# Patient Record
Sex: FEMALE | Race: WHITE | NOT HISPANIC OR LATINO | Employment: FULL TIME | URBAN - METROPOLITAN AREA
[De-identification: names, ages, dates, MRNs, and addresses within clinical notes are randomized per-mention and may not be internally consistent; named-entity substitution may affect disease eponyms.]

---

## 2024-03-25 ENCOUNTER — HOSPITAL ENCOUNTER (OUTPATIENT)
Dept: ULTRASOUND IMAGING | Facility: HOSPITAL | Age: 34
Discharge: HOME | End: 2024-03-25
Admitting: OBSTETRICS & GYNECOLOGY
Payer: COMMERCIAL

## 2024-03-25 DIAGNOSIS — R10.2 PELVIC PAIN IN FEMALE: ICD-10-CM

## 2024-03-25 DIAGNOSIS — N92.0 MENORRHAGIA WITH REGULAR CYCLE: ICD-10-CM

## 2024-03-25 PROCEDURE — 76830 TRANSVAGINAL US NON-OB: CPT

## 2024-03-26 ENCOUNTER — TELEPHONE (OUTPATIENT)
Dept: OBSTETRICS AND GYNECOLOGY | Facility: CLINIC | Age: 34
End: 2024-03-26
Payer: COMMERCIAL

## 2024-03-27 ENCOUNTER — OFFICE VISIT (OUTPATIENT)
Dept: OBSTETRICS AND GYNECOLOGY | Facility: CLINIC | Age: 34
End: 2024-03-27
Payer: COMMERCIAL

## 2024-03-27 VITALS
HEART RATE: 89 BPM | BODY MASS INDEX: 23.63 KG/M2 | WEIGHT: 147 LBS | SYSTOLIC BLOOD PRESSURE: 115 MMHG | DIASTOLIC BLOOD PRESSURE: 79 MMHG | HEIGHT: 66 IN

## 2024-03-27 DIAGNOSIS — R93.89 THICKENED ENDOMETRIUM: Primary | ICD-10-CM

## 2024-03-27 PROCEDURE — 99213 OFFICE O/P EST LOW 20 MIN: CPT | Performed by: OBSTETRICS & GYNECOLOGY

## 2024-03-27 NOTE — PROGRESS NOTES
"GYN Problem/Follow Up Visit    Chief Complaint   Patient presents with    Follow-up     FOLLOW UP U/S           HPI  Abi HENDERSON is a 33 y.o. female, , who presents for u/s f/u. Seen in 2023 for heavy menses and pelvic pain but states life got really hectic and she just now had time for her u/s. Got  and they started trying to conceive in January. States menses returned to her normal and are not as heavy anymore. Does still have occ pelvic pain. No new concerns.       Additional OB/GYN History   No LMP recorded.  Current contraception: contraceptive methods: None  Desires to: do not start contraception  Allergies : Patient has no known allergies.     The additional following portions of the patient's history were reviewed and updated as appropriate: allergies, current medications, past family history, past medical history, past social history, past surgical history, and problem list.    Review of Systems    I have reviewed and agree with the HPI, ROS, and historical information as entered above. Eneida Melendez, BARRERA    Objective   /79   Pulse 89   Ht 167.6 cm (66\")   Wt 66.7 kg (147 lb)   BMI 23.73 kg/m²     Physical Exam  Vitals reviewed.   Neurological:      Mental Status: She is alert and oriented to person, place, and time.            Assessment and Plan    Diagnoses and all orders for this visit:    1. Thickened endometrium (Primary)  -     US Non-ob Transvaginal; Future    Reviewed pelvic u/s from 3/25/24: small uterine fibroid. Endometrium 1.7 cm. Ovaries normal. Discussed repeating pelvic u/s in 6-8 weeks to monitor the endometrium and pt desires. F/u after u/s.     Counseling:  She understands the importance of having the above orders performed in a timely fashion.  She is encouraged to review her results online and/or contact or office if she has questions.     Follow Up:  Return for u/s f/u.      BARRERA Navarro  2024  "

## 2025-01-17 ENCOUNTER — HOSPITAL ENCOUNTER (EMERGENCY)
Facility: HOSPITAL | Age: 35
Discharge: HOME | End: 2025-01-17
Attending: EMERGENCY MEDICINE
Payer: COMMERCIAL

## 2025-01-17 VITALS
BODY MASS INDEX: 27.13 KG/M2 | DIASTOLIC BLOOD PRESSURE: 72 MMHG | SYSTOLIC BLOOD PRESSURE: 110 MMHG | RESPIRATION RATE: 18 BRPM | HEIGHT: 66 IN | HEART RATE: 83 BPM | TEMPERATURE: 98.1 F | OXYGEN SATURATION: 97 %

## 2025-01-17 DIAGNOSIS — O21.9 VOMITING AFFECTING PREGNANCY: Primary | ICD-10-CM

## 2025-01-17 LAB
ALBUMIN SERPL-MCNC: 4.1 G/DL (ref 3.5–5.2)
ALBUMIN/GLOB SERPL: 1.2 G/DL
ALP SERPL-CCNC: 74 U/L (ref 39–117)
ALT SERPL W P-5'-P-CCNC: 26 U/L (ref 1–33)
ANION GAP SERPL CALCULATED.3IONS-SCNC: 15 MMOL/L (ref 5–15)
AST SERPL-CCNC: 30 U/L (ref 1–32)
BACTERIA UR QL AUTO: ABNORMAL /HPF
BASOPHILS # BLD AUTO: 0.04 10*3/MM3 (ref 0–0.2)
BASOPHILS NFR BLD AUTO: 0.3 % (ref 0–1.5)
BILIRUB SERPL-MCNC: 0.3 MG/DL (ref 0–1.2)
BILIRUB UR QL STRIP: NEGATIVE
BUN SERPL-MCNC: 10 MG/DL (ref 6–20)
BUN/CREAT SERPL: 20.8 (ref 7–25)
CALCIUM SPEC-SCNC: 8.9 MG/DL (ref 8.6–10.5)
CHLORIDE SERPL-SCNC: 100 MMOL/L (ref 98–107)
CLARITY UR: CLEAR
CO2 SERPL-SCNC: 17 MMOL/L (ref 22–29)
COLOR UR: YELLOW
CREAT SERPL-MCNC: 0.48 MG/DL (ref 0.57–1)
DEPRECATED RDW RBC AUTO: 41.6 FL (ref 37–54)
EGFRCR SERPLBLD CKD-EPI 2021: 126.1 ML/MIN/1.73
EOSINOPHIL # BLD AUTO: 0.02 10*3/MM3 (ref 0–0.4)
EOSINOPHIL NFR BLD AUTO: 0.1 % (ref 0.3–6.2)
ERYTHROCYTE [DISTWIDTH] IN BLOOD BY AUTOMATED COUNT: 14.5 % (ref 12.3–15.4)
FLUAV SUBTYP SPEC NAA+PROBE: NOT DETECTED
FLUBV RNA ISLT QL NAA+PROBE: NOT DETECTED
GLOBULIN UR ELPH-MCNC: 3.4 GM/DL
GLUCOSE SERPL-MCNC: 141 MG/DL (ref 65–99)
GLUCOSE UR STRIP-MCNC: NEGATIVE MG/DL
HCG INTACT+B SERPL-ACNC: 5594 MIU/ML
HCT VFR BLD AUTO: 31.4 % (ref 34–46.6)
HGB BLD-MCNC: 9.7 G/DL (ref 12–15.9)
HGB UR QL STRIP.AUTO: NEGATIVE
HOLD SPECIMEN: NORMAL
HOLD SPECIMEN: NORMAL
HYALINE CASTS UR QL AUTO: ABNORMAL /LPF
IMM GRANULOCYTES # BLD AUTO: 0.07 10*3/MM3 (ref 0–0.05)
IMM GRANULOCYTES NFR BLD AUTO: 0.5 % (ref 0–0.5)
KETONES UR QL STRIP: ABNORMAL
LEUKOCYTE ESTERASE UR QL STRIP.AUTO: NEGATIVE
LIPASE SERPL-CCNC: 158 U/L (ref 13–60)
LYMPHOCYTES # BLD AUTO: 1.79 10*3/MM3 (ref 0.7–3.1)
LYMPHOCYTES NFR BLD AUTO: 12.9 % (ref 19.6–45.3)
MCH RBC QN AUTO: 24.9 PG (ref 26.6–33)
MCHC RBC AUTO-ENTMCNC: 30.9 G/DL (ref 31.5–35.7)
MCV RBC AUTO: 80.7 FL (ref 79–97)
MONOCYTES # BLD AUTO: 0.94 10*3/MM3 (ref 0.1–0.9)
MONOCYTES NFR BLD AUTO: 6.8 % (ref 5–12)
NEUTROPHILS NFR BLD AUTO: 11.04 10*3/MM3 (ref 1.7–7)
NEUTROPHILS NFR BLD AUTO: 79.4 % (ref 42.7–76)
NITRITE UR QL STRIP: NEGATIVE
NRBC BLD AUTO-RTO: 0 /100 WBC (ref 0–0.2)
PH UR STRIP.AUTO: 6 [PH] (ref 5–8)
PLATELET # BLD AUTO: 351 10*3/MM3 (ref 140–450)
PMV BLD AUTO: 9.7 FL (ref 6–12)
POTASSIUM SERPL-SCNC: 3.9 MMOL/L (ref 3.5–5.2)
PROT SERPL-MCNC: 7.5 G/DL (ref 6–8.5)
PROT UR QL STRIP: ABNORMAL
RBC # BLD AUTO: 3.89 10*6/MM3 (ref 3.77–5.28)
RBC # UR STRIP: ABNORMAL /HPF
REF LAB TEST METHOD: ABNORMAL
RSV RNA NPH QL NAA+NON-PROBE: NOT DETECTED
SARS-COV-2 RNA RESP QL NAA+PROBE: NOT DETECTED
SODIUM SERPL-SCNC: 132 MMOL/L (ref 136–145)
SP GR UR STRIP: 1.03 (ref 1–1.03)
SQUAMOUS #/AREA URNS HPF: ABNORMAL /HPF
UROBILINOGEN UR QL STRIP: ABNORMAL
WBC # UR STRIP: ABNORMAL /HPF
WBC NRBC COR # BLD AUTO: 13.9 10*3/MM3 (ref 3.4–10.8)
WHOLE BLOOD HOLD COAG: NORMAL
WHOLE BLOOD HOLD SPECIMEN: NORMAL

## 2025-01-17 PROCEDURE — 96375 TX/PRO/DX INJ NEW DRUG ADDON: CPT

## 2025-01-17 PROCEDURE — 25010000002 THIAMINE PER 100 MG

## 2025-01-17 PROCEDURE — 36415 COLL VENOUS BLD VENIPUNCTURE: CPT

## 2025-01-17 PROCEDURE — 81001 URINALYSIS AUTO W/SCOPE: CPT

## 2025-01-17 PROCEDURE — 83690 ASSAY OF LIPASE: CPT

## 2025-01-17 PROCEDURE — 85025 COMPLETE CBC W/AUTO DIFF WBC: CPT

## 2025-01-17 PROCEDURE — 96374 THER/PROPH/DIAG INJ IV PUSH: CPT

## 2025-01-17 PROCEDURE — 87637 SARSCOV2&INF A&B&RSV AMP PRB: CPT

## 2025-01-17 PROCEDURE — 25010000002 ONDANSETRON PER 1 MG

## 2025-01-17 PROCEDURE — 25810000003 SODIUM CHLORIDE 0.9 % SOLUTION

## 2025-01-17 PROCEDURE — 99283 EMERGENCY DEPT VISIT LOW MDM: CPT

## 2025-01-17 PROCEDURE — 80053 COMPREHEN METABOLIC PANEL: CPT

## 2025-01-17 PROCEDURE — 84702 CHORIONIC GONADOTROPIN TEST: CPT

## 2025-01-17 RX ORDER — ONDANSETRON 2 MG/ML
4 INJECTION INTRAMUSCULAR; INTRAVENOUS ONCE
Status: COMPLETED | OUTPATIENT
Start: 2025-01-17 | End: 2025-01-17

## 2025-01-17 RX ORDER — THIAMINE HYDROCHLORIDE 100 MG/ML
100 INJECTION, SOLUTION INTRAMUSCULAR; INTRAVENOUS ONCE
Status: COMPLETED | OUTPATIENT
Start: 2025-01-17 | End: 2025-01-17

## 2025-01-17 RX ORDER — SODIUM CHLORIDE 0.9 % (FLUSH) 0.9 %
10 SYRINGE (ML) INJECTION AS NEEDED
Status: DISCONTINUED | OUTPATIENT
Start: 2025-01-17 | End: 2025-01-17 | Stop reason: HOSPADM

## 2025-01-17 RX ADMIN — SODIUM CHLORIDE 1000 ML: 9 INJECTION, SOLUTION INTRAVENOUS at 16:48

## 2025-01-17 RX ADMIN — SODIUM CHLORIDE 1000 ML: 9 INJECTION, SOLUTION INTRAVENOUS at 15:30

## 2025-01-17 RX ADMIN — THIAMINE HYDROCHLORIDE 100 MG: 100 INJECTION, SOLUTION INTRAMUSCULAR; INTRAVENOUS at 15:40

## 2025-01-17 RX ADMIN — ONDANSETRON 4 MG: 2 INJECTION INTRAMUSCULAR; INTRAVENOUS at 15:30

## 2025-01-17 NOTE — ED PROVIDER NOTES
Time: 3:00 PM EST  Date of encounter:  1/17/2025  Independent Historian/Clinical History and Information was obtained by:   Patient    History is limited by: N/A    Chief Complaint: Vomiting      History of Present Illness:  Patient is a 34 y.o. year old female who presents to the emergency department for evaluation of vomiting ongoing for 1 day.  Patient reports she is currently 26 weeks pregnant with LMP 7/18/2024.  Patient reports similar episode of vomiting 2 weeks ago when she was diagnosed with flu but that her symptoms resolved and began again last night.  Patient reports she was sent here by urgent care due to vomiting.  G1, P0.  Patient reports no complications thus far with pregnancy.  Denies any abdominal pain, fevers, chills, body aches, vaginal bleeding, back pain.      Patient Care Team  Primary Care Provider: [place] APRN    Past Medical History:     No Known Allergies  Past Medical History:   Diagnosis Date    Anxiety     Depression      History reviewed. No pertinent surgical history.  Family History   Problem Relation Age of Onset    Lung cancer Mother     Stroke Paternal Grandfather     Heart attack Paternal Grandfather     Breast cancer Neg Hx     Ovarian cancer Neg Hx     Uterine cancer Neg Hx     Colon cancer Neg Hx     Melanoma Neg Hx     Pulmonary embolism Neg Hx     Deep vein thrombosis Neg Hx        Home Medications:  Prior to Admission medications    Medication Sig Start Date End Date Taking? Authorizing Provider   acetaminophen (TYLENOL) 500 MG tablet Take 2 tablets by mouth. 10/21/24   Paris Gomez MD   albuterol sulfate  (90 Base) MCG/ACT inhaler Inhale 2 puffs Every 4 (Four) Hours As Needed for Shortness of Air or Wheezing. 12/17/24   Radha Valles APRN   ALPRAZolam (Xanax) 0.5 MG tablet Take 1 tablet by mouth Daily. 4/13/20   Paris Gomez MD   amphetamine-dextroamphetamine (Adderall) 10 MG tablet Take 1 tablet by mouth Every afternoon to boost AM  dose of adderall XR in the morning. 10/18/22      amphetamine-dextroamphetamine XR (Adderall XR) 15 MG 24 hr capsule Take one capsule every morning 10/21/22      aspirin 81 MG EC tablet Take 1 tablet by mouth Daily. 9/24/24   Paris Gomez MD   atomoxetine (STRATTERA) 40 MG capsule Take 40 mg by mouth Daily. 9/14/20   Paris Gomez MD   azithromycin (Zithromax Z-Keven) 250 MG tablet Take 2 tab po today then take 1 tab po qd x 4 days 1/12/22   Carlos Sullivan MD   benzonatate (TESSALON) 200 MG capsule Take 1 capsule by mouth 3 (Three) Times a Day As Needed for Cough. 1/12/22   Carlos Sullivan MD   brompheniramine-pseudoephedrine-DM 30-2-10 MG/5ML syrup Take 5 mL by mouth 4 (Four) Times a Day As Needed for Cough or Allergies. 1/12/22   Carlos Sullivan MD   busPIRone (BUSPAR) 10 MG tablet Take 1 tablet by mouth. 9/10/24   Paris Gomez MD   FLUoxetine (PROzac) 20 MG tablet Take 20 mg by mouth Daily. 4/13/20   Paris Gomez MD   FLUoxetine (PROzac) 40 MG capsule take one capsule every morning 10/18/22      metoclopramide (REGLAN) 10 MG tablet Take 1 tablet by mouth. 9/24/24   Paris Gomez MD   ondansetron ODT (ZOFRAN-ODT) 4 MG disintegrating tablet Place 1 tablet on the tongue Every 8 (Eight) Hours As Needed for Vomiting or Nausea. 12/17/24   Radha Valles APRN   SUMAtriptan (IMITREX) 100 MG tablet Take 1 tablet by mouth. 10/21/24   Paris Gomez MD        Social History:   Social History     Tobacco Use    Smoking status: Never    Smokeless tobacco: Never   Vaping Use    Vaping status: Never Used   Substance Use Topics    Alcohol use: Never    Drug use: Never         Review of Systems:  Review of Systems   Constitutional:  Negative for chills, fatigue and fever.   HENT:  Negative for ear pain, rhinorrhea and sore throat.    Eyes:  Negative for visual disturbance.   Respiratory:  Negative for cough and shortness of breath.    Cardiovascular:  Negative for chest pain.  "  Gastrointestinal:  Positive for nausea and vomiting. Negative for abdominal pain and diarrhea.   Genitourinary:  Negative for difficulty urinating.   Musculoskeletal:  Negative for arthralgias, back pain and myalgias.   Skin:  Negative for rash.   Neurological:  Negative for light-headedness and headaches.   Hematological:  Negative for adenopathy.   Psychiatric/Behavioral: Negative.          Physical Exam:  /72 (BP Location: Right arm, Patient Position: Sitting)   Pulse 83   Temp 98.1 °F (36.7 °C) (Oral)   Resp 18   Ht 167.6 cm (66\")   LMP 07/18/2024 (Exact Date)   SpO2 97%   BMI 27.13 kg/m²     Physical Exam  Vitals and nursing note reviewed.   Constitutional:       General: She is not in acute distress.     Appearance: Normal appearance. She is not toxic-appearing.   HENT:      Head: Normocephalic and atraumatic.      Nose: Nose normal.      Mouth/Throat:      Mouth: Mucous membranes are moist.   Eyes:      Conjunctiva/sclera: Conjunctivae normal.   Cardiovascular:      Rate and Rhythm: Normal rate.      Pulses: Normal pulses.      Heart sounds: Normal heart sounds.   Pulmonary:      Effort: Pulmonary effort is normal.      Breath sounds: Normal breath sounds.   Abdominal:      General: Bowel sounds are normal.      Palpations: Abdomen is soft.      Tenderness: There is no abdominal tenderness.   Musculoskeletal:         General: Normal range of motion.      Cervical back: Normal range of motion.   Skin:     General: Skin is warm and dry.   Neurological:      General: No focal deficit present.      Mental Status: She is alert and oriented to person, place, and time.   Psychiatric:         Mood and Affect: Mood normal.         Behavior: Behavior normal.         Thought Content: Thought content normal.         Judgment: Judgment normal.                    Medical Decision Making:      Comorbidities that affect care:    Pregnancy    External Notes reviewed:    None      The following orders were " placed and all results were independently analyzed by me:  Orders Placed This Encounter   Procedures    COVID-19, FLU A/B, RSV PCR 1 HR TAT - Swab, Nasopharynx    Addison Draw    Comprehensive Metabolic Panel    Lipase    Urinalysis With Microscopic If Indicated (No Culture) - Urine, Clean Catch    hCG, Quantitative, Pregnancy    CBC Auto Differential    Urinalysis, Microscopic Only - Urine, Clean Catch    Monitor fetal heart tones    CBC & Differential    Green Top (Gel)    Lavender Top    Gold Top - SST    Light Blue Top       Medications Given in the Emergency Department:  Medications   thiamine (B-1) injection 100 mg (100 mg Intravenous Given 1/17/25 1540)   sodium chloride 0.9 % bolus 1,000 mL (0 mL Intravenous Stopped 1/17/25 1645)   ondansetron (ZOFRAN) injection 4 mg (4 mg Intravenous Given 1/17/25 1530)   sodium chloride 0.9 % bolus 1,000 mL (0 mL Intravenous Stopped 1/17/25 1754)        ED Course:         Labs:    Lab Results (last 24 hours)       Procedure Component Value Units Date/Time    POCT URINALYSIS DIPSTICK, AUTOMATED [[id number]]  (Abnormal) Collected: 01/17/25 1228    Specimen: Urine Updated: 01/17/25 1321     Color, UA Yellow     Appearance, Fluid Clear     Specific Gravity, UA 1.03     pH, UA 5     Leukocytes, UA Negative     Nitrite, UA Negative     Total Protein, urine 30 mg/dL     Glucose, UA Normal     External Ketones, Urine 150 mg/dL     Urobilinogen, UA Normal mg/dL      External Bilirubin, Urine Negative     Blood, UA Negative     QC Acceptable     Lot Number [id number]     Expiration Date 11/30/2025     Comment --    CBC & Differential [[id number]]  (Abnormal) Collected: 01/17/25 1422    Specimen: Blood Updated: 01/17/25 1522    Narrative:      The following orders were created for panel order CBC & Differential.  Procedure                               Abnormality         Status                     ---------                               -----------         ------                      CBC Auto Differential[[id number]]        Abnormal            Final result               Scan Slide[[id number]]                                                                    Please view results for these tests on the individual orders.    Comprehensive Metabolic Panel [[id number]]  (Abnormal) Collected: 01/17/25 1422    Specimen: Blood Updated: 01/17/25 1448     Glucose 141 mg/dL      BUN 10 mg/dL      Creatinine 0.48 mg/dL      Sodium 132 mmol/L      Potassium 3.9 mmol/L      Comment: Specimen hemolyzed.  Result may be falsely elevated.        Chloride 100 mmol/L      CO2 17.0 mmol/L      Calcium 8.9 mg/dL      Total Protein 7.5 g/dL      Albumin 4.1 g/dL      ALT (SGPT) 26 U/L      Comment: Specimen hemolyzed.  Result may  be falsely elevated.        AST (SGOT) 30 U/L      Comment: Specimen hemolyzed.  Result may be falsely elevated.        Alkaline Phosphatase 74 U/L      Total Bilirubin 0.3 mg/dL      Globulin 3.4 gm/dL      A/G Ratio 1.2 g/dL      BUN/Creatinine Ratio 20.8     Anion Gap 15.0 mmol/L      eGFR 126.1 mL/min/1.73     Narrative:      GFR Categories in Chronic Kidney Disease (CKD)      GFR Category          GFR (mL/min/1.73)    Interpretation  G1                     90 or greater         Normal or high (1)  G2                      60-89                Mild decrease (1)  G3a                   45-59                Mild to moderate decrease  G3b                   30-44                Moderate to severe decrease  G4                    15-29                Severe decrease  G5                    14 or less           Kidney failure          (1)In the absence of evidence of kidney disease, neither GFR category G1 or G2 fulfill the criteria for CKD.    eGFR calculation 2021 CKD-EPI creatinine equation, which does not include race as a factor    Lipase [[id number]]  (Abnormal) Collected: 01/17/25 1422    Specimen: Blood Updated: 01/17/25 1448     Lipase 158 U/L      Comment: Specimen hemolyzed.  Results may  be falsely decreased.       hCG, Quantitative, Pregnancy [[id number]] Collected: 01/17/25 1422    Specimen: Blood Updated: 01/17/25 1451     HCG Quantitative 5,594.00 mIU/mL     Narrative:      HCG Ranges by Gestational Age    Females - non-pregnant premenopausal   </= 1mIU/mL HCG  Females - postmenopausal               </= 7mIU/mL HCG    3 Weeks       5.4   -      72 mIU/mL  4 Weeks      10.2   -     708 mIU/mL  5 Weeks       217   -   8,245 mIU/mL  6 Weeks       152   -  32,177 mIU/mL  7 Weeks     4,059   - 153,767 mIU/mL  8 Weeks    31,366   - 149,094 mIU/mL  9 Weeks    59,109   - 135,901 mIU/mL  10 Weeks   44,186   - 170,409 mIU/mL  12 Weeks   27,107   - 201,615 mIU/mL  14 Weeks   24,302   -  93,646 mIU/mL  15 Weeks   12,540   -  69,747 mIU/mL  16 Weeks    8,904   -  55,332 mIU/mL  17 Weeks    8,240   -  51,793 mIU/mL  18 Weeks    9,649   -  55,271 mIU/mL      Urinalysis With Microscopic If Indicated (No Culture) - Urine, Clean Catch [[id number]]  (Abnormal) Collected: 01/17/25 1456    Specimen: Urine, Clean Catch Updated: 01/17/25 1505     Color, UA Yellow     Appearance, UA Clear     pH, UA 6.0     Specific Gravity, UA 1.028     Glucose, UA Negative     Ketones, UA >=160 mg/dL (4+)     Bilirubin, UA Negative     Blood, UA Negative     Protein, UA 30 mg/dL (1+)     Leuk Esterase, UA Negative     Nitrite, UA Negative     Urobilinogen, UA 0.2 E.U./dL    Urinalysis, Microscopic Only - Urine, Clean Catch [[id number]]  (Abnormal) Collected: 01/17/25 1456    Specimen: Urine, Clean Catch Updated: 01/17/25 1506     RBC, UA 0-2 /HPF      WBC, UA 0-2 /HPF      Bacteria, UA 1+ /HPF      Squamous Epithelial Cells, UA 0-2 /HPF      Hyaline Casts, UA 7-12 /LPF      Methodology Automated Microscopy    CBC Auto Differential [[id number]]  (Abnormal) Collected: 01/17/25 1511    Specimen: Blood Updated: 01/17/25 1522     WBC 13.90 10*3/mm3      RBC 3.89 10*6/mm3      Hemoglobin 9.7 g/dL      Hematocrit 31.4 %      MCV 80.7 fL       MCH 24.9 pg      MCHC 30.9 g/dL      RDW 14.5 %      RDW-SD 41.6 fl      MPV 9.7 fL      Platelets 351 10*3/mm3      Neutrophil % 79.4 %      Lymphocyte % 12.9 %      Monocyte % 6.8 %      Eosinophil % 0.1 %      Basophil % 0.3 %      Immature Grans % 0.5 %      Neutrophils, Absolute 11.04 10*3/mm3      Lymphocytes, Absolute 1.79 10*3/mm3      Monocytes, Absolute 0.94 10*3/mm3      Eosinophils, Absolute 0.02 10*3/mm3      Basophils, Absolute 0.04 10*3/mm3      Immature Grans, Absolute 0.07 10*3/mm3      nRBC 0.0 /100 WBC     COVID-19, FLU A/B, RSV PCR 1 HR TAT - Swab, Nasopharynx [[id number]]  (Normal) Collected: 01/17/25 1511    Specimen: Swab from Nasopharynx Updated: 01/17/25 1600     COVID19 Not Detected     Influenza A PCR Not Detected     Influenza B PCR Not Detected     RSV, PCR Not Detected    Narrative:      Fact sheet for providers: https://www.fda.gov/media/513637/download    Fact sheet for patients: https://www.fda.gov/media/595617/download    Test performed by PCR.             Imaging:    No Radiology Exams Resulted Within Past 24 Hours      Differential Diagnosis and Discussion:    Vomiting: Differential diagnosis includes but is not limited to migraine, labyrinthine disorders, psychogenic, metabolic and endocrine causes, peptic ulcer, gastric outlet obstruction, gastritis, gastroenteritis, appendicitis, intestinal obstruction, paralytic ileus, food poisoning, cholecystitis, acute hepatitis, acute pancreatitis, acute febrile illness, and myocardial infarction.    PROCEDURES:    Labs were collected in the emergency department and all labs were reviewed and interpreted by me.    No orders to display       Procedures    MDM  Number of Diagnoses or Management Options  Vomiting affecting pregnancy  Diagnosis management comments: I have explained the patient´s condition, diagnoses and treatment plan based on the information available to me at this time. I have answered questions and addressed any concerns.  The patient has a good  understanding of the patient´s diagnosis, condition, and treatment plan as can be expected at this point. The vital signs have been stable. The patient´s condition is stable and appropriate for discharge from the emergency department.      The patient will pursue further outpatient evaluation with the primary care physician or other designated or consulting physician as outlined in the discharge instructions. They are agreeable to this plan of care and follow-up instructions have been explained in detail. The patient has received these instructions in written format and has expressed an understanding of the discharge instructions. The patient is aware that any significant change in condition or worsening of symptoms should prompt an immediate return to this or the closest emergency department or call to 911.        Amount and/or Complexity of Data Reviewed  Clinical lab tests: reviewed  Decide to obtain previous medical records or to obtain history from someone other than the patient: yes                       Patient Care Considerations:    SEPSIS was considered but is NOT present in the emergency department as SIRS criteria is not present.      Consultants/Shared Management Plan:    SHARED VISIT: I have discussed the case with my supervising physician, Dr. Mares who states patient can be rehydrated in the ED and discharged home with close follow-up by obstetrician. The substantive portion of the medical decision was made by the attesting physician who made or approve the management plan and will take responsibility for the patient.  Clinical findings were discussed and ultimate disposition was made in consult with supervising physician.    Social Determinants of Health:    Patient is independent, reliable, and has access to care.       Disposition and Care Coordination:    Discharged: The patient is suitable and stable for discharge with no need for consideration of admission.    I have  explained the patient´s condition, diagnoses and treatment plan based on the information available to me at this time. I have answered questions and addressed any concerns. The patient has a good  understanding of the patient´s diagnosis, condition, and treatment plan as can be expected at this point. The vital signs have been stable. The patient´s condition is stable and appropriate for discharge from the emergency department.      The patient will pursue further outpatient evaluation with the primary care physician or other designated or consulting physician as outlined in the discharge instructions. They are agreeable to this plan of care and follow-up instructions have been explained in detail. The patient has received these instructions in written format and has expressed an understanding of the discharge instructions. The patient is aware that any significant change in condition or worsening of symptoms should prompt an immediate return to this or the closest emergency department or call to 911.  I have explained discharge medications and the need for follow up with the patient/caretakers. This was also printed in the discharge instructions. Patient was discharged with the following medications and follow up:      Medication List      No changes were made to your prescriptions during this visit.      [place], APRN  [address] [address] [phone]    Go to   As needed       Final diagnoses:   Vomiting affecting pregnancy        ED Disposition       ED Disposition   Discharge    Condition   Stable    Comment   --               This medical record created using voice recognition software.             Olive Domingo, APRN  01/17/25 [id number]

## 2025-01-17 NOTE — DISCHARGE INSTRUCTIONS
Please call to schedule follow-up with your obstetrician as soon as possible.  Return to the ED for intractable vomiting, abdominal pain, fevers greater than 100.4, vaginal bleeding or any other new or worsening concerning symptoms.  Continue to take the nausea medications that were prescribed to you by your obstetrician.  Push fluids at home and return to the ED if you are unable to hold down liquids.

## 2025-01-17 NOTE — ED PROVIDER NOTES
"SHARED VISIT NOTE:    Patient is 34 y.o. year old female that presents to the ED for evaluation of vomiting during pregnancy.     Physical Exam    ED Course:    /77   Pulse 76   Temp 98.4 °F (36.9 °C) (Oral)   Resp 16   Ht 167.6 cm (66\")   LMP 07/18/2024 (Exact Date)   SpO2 100%   BMI 27.13 kg/m²   Results for orders placed or performed during the hospital encounter of 01/17/25   Comprehensive Metabolic Panel    Collection Time: 01/17/25  2:22 PM    Specimen: Blood   Result Value Ref Range    Glucose 141 (H) 65 - 99 mg/dL    BUN 10 6 - 20 mg/dL    Creatinine 0.48 (L) 0.57 - 1.00 mg/dL    Sodium 132 (L) 136 - 145 mmol/L    Potassium 3.9 3.5 - 5.2 mmol/L    Chloride 100 98 - 107 mmol/L    CO2 17.0 (L) 22.0 - 29.0 mmol/L    Calcium 8.9 8.6 - 10.5 mg/dL    Total Protein 7.5 6.0 - 8.5 g/dL    Albumin 4.1 3.5 - 5.2 g/dL    ALT (SGPT) 26 1 - 33 U/L    AST (SGOT) 30 1 - 32 U/L    Alkaline Phosphatase 74 39 - 117 U/L    Total Bilirubin 0.3 0.0 - 1.2 mg/dL    Globulin 3.4 gm/dL    A/G Ratio 1.2 g/dL    BUN/Creatinine Ratio 20.8 7.0 - 25.0    Anion Gap 15.0 5.0 - 15.0 mmol/L    eGFR 126.1 >60.0 mL/min/1.73   Lipase    Collection Time: 01/17/25  2:22 PM    Specimen: Blood   Result Value Ref Range    Lipase 158 (H) 13 - 60 U/L   hCG, Quantitative, Pregnancy    Collection Time: 01/17/25  2:22 PM    Specimen: Blood   Result Value Ref Range    HCG Quantitative 5,594.00 mIU/mL   Green Top (Gel)    Collection Time: 01/17/25  2:22 PM   Result Value Ref Range    Extra Tube Hold for add-ons.    Lavender Top    Collection Time: 01/17/25  2:22 PM   Result Value Ref Range    Extra Tube hold for add-on    Gold Top - SST    Collection Time: 01/17/25  2:22 PM   Result Value Ref Range    Extra Tube Hold for add-ons.    Urinalysis With Microscopic If Indicated (No Culture) - Urine, Clean Catch    Collection Time: 01/17/25  2:56 PM    Specimen: Urine, Clean Catch   Result Value Ref Range    Color, UA Yellow Yellow, Straw    " Appearance, UA Clear Clear    pH, UA 6.0 5.0 - 8.0    Specific Gravity, UA 1.028 1.005 - 1.030    Glucose, UA Negative Negative    Ketones, UA >=160 mg/dL (4+) (A) Negative    Bilirubin, UA Negative Negative    Blood, UA Negative Negative    Protein, UA 30 mg/dL (1+) (A) Negative    Leuk Esterase, UA Negative Negative    Nitrite, UA Negative Negative    Urobilinogen, UA 0.2 E.U./dL 0.2 - 1.0 E.U./dL   Urinalysis, Microscopic Only - Urine, Clean Catch    Collection Time: 01/17/25  2:56 PM    Specimen: Urine, Clean Catch   Result Value Ref Range    RBC, UA 0-2 None Seen, 0-2 /HPF    WBC, UA 0-2 None Seen, 0-2 /HPF    Bacteria, UA 1+ (A) None Seen /HPF    Squamous Epithelial Cells, UA 0-2 None Seen, 0-2 /HPF    Hyaline Casts, UA 7-12 None Seen /LPF    Methodology Automated Microscopy    CBC Auto Differential    Collection Time: 01/17/25  3:11 PM    Specimen: Blood   Result Value Ref Range    WBC 13.90 (H) 3.40 - 10.80 10*3/mm3    RBC 3.89 3.77 - 5.28 10*6/mm3    Hemoglobin 9.7 (L) 12.0 - 15.9 g/dL    Hematocrit 31.4 (L) 34.0 - 46.6 %    MCV 80.7 79.0 - 97.0 fL    MCH 24.9 (L) 26.6 - 33.0 pg    MCHC 30.9 (L) 31.5 - 35.7 g/dL    RDW 14.5 12.3 - 15.4 %    RDW-SD 41.6 37.0 - 54.0 fl    MPV 9.7 6.0 - 12.0 fL    Platelets 351 140 - 450 10*3/mm3    Neutrophil % 79.4 (H) 42.7 - 76.0 %    Lymphocyte % 12.9 (L) 19.6 - 45.3 %    Monocyte % 6.8 5.0 - 12.0 %    Eosinophil % 0.1 (L) 0.3 - 6.2 %    Basophil % 0.3 0.0 - 1.5 %    Immature Grans % 0.5 0.0 - 0.5 %    Neutrophils, Absolute 11.04 (H) 1.70 - 7.00 10*3/mm3    Lymphocytes, Absolute 1.79 0.70 - 3.10 10*3/mm3    Monocytes, Absolute 0.94 (H) 0.10 - 0.90 10*3/mm3    Eosinophils, Absolute 0.02 0.00 - 0.40 10*3/mm3    Basophils, Absolute 0.04 0.00 - 0.20 10*3/mm3    Immature Grans, Absolute 0.07 (H) 0.00 - 0.05 10*3/mm3    nRBC 0.0 0.0 - 0.2 /100 WBC   Light Blue Top    Collection Time: 01/17/25  3:12 PM   Result Value Ref Range    Extra Tube Hold for add-ons.      Medications    thiamine (B-1) injection 100 mg (has no administration in time range)   sodium chloride 0.9 % flush 10 mL (has no administration in time range)   sodium chloride 0.9 % bolus 1,000 mL (1,000 mL Intravenous New Bag 1/17/25 1530)   ondansetron (ZOFRAN) injection 4 mg (4 mg Intravenous Given 1/17/25 1530)     No results found.    MDM:    Procedures    Labs were collected in the emergency department and all labs were reviewed and interpreted by me.          SHARED VISIT ATTESTATION:    This visit was performed by both myself and an APC.  I performed the substantive portion of the medical decision making. The management plan was made or approved by me, and I take responsibility for patient management.           Semaj Mares MD  15:39 EST  01/17/25         Semaj Mares MD  01/17/25 1534

## 2025-04-06 ENCOUNTER — READMISSION MANAGEMENT (OUTPATIENT)
Dept: CALL CENTER | Facility: HOSPITAL | Age: 35
End: 2025-04-06
Payer: COMMERCIAL

## 2025-04-06 NOTE — OUTREACH NOTE
Prep Survey      Flowsheet Row Responses   [facility] patient discharged from? Non-BH   Is LACE score < 7 ? Non-BH Discharge   Eligibility Not Eligible   What are the reasons patient is not eligible? Other  [ob pt]   Does the patient have one of the following disease processes/diagnoses(primary or secondary)? Other   Prep survey completed? Yes            SANJUANA THOMPSON - Registered Nurse